# Patient Record
Sex: MALE | Race: WHITE | Employment: STUDENT | URBAN - METROPOLITAN AREA
[De-identification: names, ages, dates, MRNs, and addresses within clinical notes are randomized per-mention and may not be internally consistent; named-entity substitution may affect disease eponyms.]

---
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
## 2024-07-15 NOTE — ED INITIAL ASSESSMENT (HPI)
Pt here w/ c/o abd pain, nausea and diarrhea. Pt was here last week for similar s/s but did not have pain at that time. Pt states liquid diarrhea 3-5 times a day and constant nausea.
show

## (undated) NOTE — LETTER
Date & Time: 8/3/2020, 7:22 PM  Patient: Robyn Lozano  Encounter Provider(s):    BRIE Murray       To Whom It May Concern:    Albertnancy Tirado was seen and treated in our department on 8/3/2020. He may return 8/6/2020 if symptoms improved.     If y

## (undated) NOTE — LETTER
Date: 8/14/2020    Patient Name: Sebastian Anaya          To Whom it may concern: This letter has been written at the patient's request. The above patient was seen at the [facility] for treatment of a medical condition.     If patient has fev